# Patient Record
Sex: FEMALE | Race: WHITE
[De-identification: names, ages, dates, MRNs, and addresses within clinical notes are randomized per-mention and may not be internally consistent; named-entity substitution may affect disease eponyms.]

---

## 2019-01-16 ENCOUNTER — HOSPITAL ENCOUNTER (EMERGENCY)
Dept: HOSPITAL 97 - ER | Age: 74
Discharge: HOME | End: 2019-01-16
Payer: COMMERCIAL

## 2019-01-16 VITALS — DIASTOLIC BLOOD PRESSURE: 62 MMHG | SYSTOLIC BLOOD PRESSURE: 157 MMHG

## 2019-01-16 VITALS — OXYGEN SATURATION: 100 % | TEMPERATURE: 98.2 F

## 2019-01-16 DIAGNOSIS — I10: ICD-10-CM

## 2019-01-16 DIAGNOSIS — Z79.899: ICD-10-CM

## 2019-01-16 DIAGNOSIS — K21.9: ICD-10-CM

## 2019-01-16 DIAGNOSIS — R07.9: Primary | ICD-10-CM

## 2019-01-16 DIAGNOSIS — E11.9: ICD-10-CM

## 2019-01-16 LAB
BUN BLD-MCNC: 14 MG/DL (ref 7–18)
GLUCOSE SERPLBLD-MCNC: 102 MG/DL (ref 74–106)
HCT VFR BLD CALC: 28.7 % (ref 36–45)
LYMPHOCYTES # SPEC AUTO: 2.6 K/UL (ref 0.7–4.9)
PMV BLD: 7.5 FL (ref 7.6–11.3)
POTASSIUM SERPL-SCNC: 3.8 MMOL/L (ref 3.5–5.1)
RBC # BLD: 3.23 M/UL (ref 3.86–4.86)

## 2019-01-16 PROCEDURE — 85025 COMPLETE CBC W/AUTO DIFF WBC: CPT

## 2019-01-16 PROCEDURE — 36415 COLL VENOUS BLD VENIPUNCTURE: CPT

## 2019-01-16 PROCEDURE — 71275 CT ANGIOGRAPHY CHEST: CPT

## 2019-01-16 PROCEDURE — 99285 EMERGENCY DEPT VISIT HI MDM: CPT

## 2019-01-16 PROCEDURE — 80048 BASIC METABOLIC PNL TOTAL CA: CPT

## 2019-01-16 NOTE — ER
Nurse's Notes                                                                                     

 [facility]                                                                

Name: Rossi Caro                                                                              

Age: 73 yrs                                                                                       

Sex: Female                                                                                       

: 1945                                                                                   

MRN: [id number]                                                                                   

Arrival Date: 2019                                                                          

Time: 16:09                                                                                       

Account#: [id number]                                                                            

Bed 18                                                                                            

Private MD:                                                                                       

Diagnosis: Chest pain, unspecified                                                                

                                                                                                  

Presentation:                                                                                     

                                                                                             

16:20 Presenting complaint: EMS states: She had some left sided chest tightness and shortness aj1 

      of breath this morning at 0700 that lasted approximately 2 minutes[address] Patient's Dr            

      ordered labs and her D-Dimer came out elevated. Patient has a cast on left leg from         

      recent femur fracture sustained on 2019. Transition of care: patient was       

      received from another setting of care (long-term care [facility]), [facility]. Onset of      

      symptoms was 2019 at 07:00. Risk Assessment: Do you want to hurt yourself       

      or someone else? Patient reports no desire to harm self or others. Initial Sepsis           

      Screen: Does the patient meet any 2 criteria? Yes Does the patient have a suspected         

      source of infection? No. Patient's initial sepsis screen is negative. Care prior to         

      arrival: None.                                                                              

16:20 Method Of Arrival: EMS: [facility]                                                aj1 

16:20 Acuity: DIDI 3                                                                           aj1 

                                                                                                  

Triage Assessment:                                                                                

16:32 General: Appears in no apparent distress. comfortable, Behavior is calm, cooperative,   aj1 

      appropriate for age. Pain: Denies pain. Neuro: Level of Consciousness is awake, alert,      

      obeys commands.                                                                             

                                                                                                  

Historical:                                                                                       

- Allergies:                                                                                      

16:32 Phenergan;                                                                              aj1 

- Home Meds:                                                                                      

16:32 Zofran (as hydrochloride) 4 mg Oral tab 1 tabs every 8 hours as needed [Active];        aj1 

      loperamide 2 mg Oral tab 1 tabs every 4 hours as needed [Active]; Xiidra One drop in        

      both eyes every 12 hours [Active]; calcium carbonate 1000 mg Oral tab twice a day           

      [Active]; cholecalciferol (vitamin D3) oral oral daily [Active]; omeprazole 20 mg Oral      

      cpDR 1 cap once daily [Active]; Lovenox 40 mg/0.4 mL Sub-Q syrg once daily [Active];        

      docusate sodium 100 mg Oral cap 1 cap once daily [Active]; Protonix 40 mg Oral TbEC 1       

      tab once daily [Active]; hydrochlorothiazide 12.5 mg Oral tab 1 tab once daily              

      [Active]; Lumigan 0.01 % ophthalmic drop one drop in both eyes at bedtime [Active];         

      metformin 500 mg Oral Tb24 1 tab 2 times per day [Active]; tramadol 50 mg Oral tab 1        

      tab every 6 hours as needed [Active];                                                       

- PMHx:                                                                                           

16:32 Diabetes - NIDDM; Hypertension; femur fracture; GERD; Glaucoma;                         aj1 

                                                                                                  

- Immunization history:: Flu vaccine is up to date.                                               

- Social history:: Smoking status: Patient/guardian denies using tobacco.                         

- Ebola Screening: : Patient denies travel to an Ebola-affected area in the 21 days               

  before illness onset.                                                                           

- Family history:: not pertinent.                                                                 

- Hospitalizations: : No recent hospitalization is reported.                                      

                                                                                                  

                                                                                                  

Screenin:01 Abuse screen: Denies threats or abuse. Denies injuries from another. Nutritional        aj1 

      screening: No deficits noted. Tuberculosis screening: No symptoms or risk factors           

      identified.                                                                                 

21:58 Fall Risk Fall in past 12 months (25 points). No secondary diagnosis (0 pts). No IV (0  aj1 

      pts). Ambulatory Aid- Crutches/Cane/Walker (15 pts). Gait- Impaired (20 pts.). Mental       

      Status- Oriented to own ability (0 pts). Total Martinez Fall Scale indicates High Risk         

      Score (45 or more points). As available patient and family educated on Fall Prevention      

      Program and Strategies.                                                                     

                                                                                                  

Assessment:                                                                                       

17:01 General: Appears in no apparent distress. comfortable, Behavior is calm, cooperative,   aj1 

      appropriate for age. Pain: Denies pain. Neuro: Level of Consciousness is awake, alert,      

      obeys commands. Cardiovascular: Reports chest pain, that has now resolved Heart tones       

      S1 S2 present Patient's skin is warm and dry. Rhythm is sinus rhythm. Respiratory:          

      Reports shortness of breath that has now resolved Airway is patent Respiratory effort       

      is even, unlabored, Respiratory pattern is regular, symmetrical. GI: No signs and/or        

      symptoms were reported involving the gastrointestinal system. : No signs and/or           

      symptoms were reported regarding the genitourinary system. EENT: No signs and/or            

      symptoms were reported regarding the EENT system. Derm: No signs and/or symptoms            

      reported regarding the dermatologic system. Skin is pink, warm \T\ dry. normal.             

      Musculoskeletal: Capillary refill < 3 seconds, in left toes. Range of motion: limited       

      in left knee Patient has a recent femur fracture.                                           

18:03 Reassessment: Patient appears in no apparent distress at this time. No changes from     [facility] 

      previously documented assessment. Patient and/or family updated on plan of care and         

      expected duration. Pain level reassessed. Patient is alert, oriented x 3, equal             

      unlabored respirations, skin warm/dry/pink.                                                 

18:52 Reassessment: Report given to [facility]. They will arrange transport to    [facility] 

      send patient back.                                                                          

19:00 Reassessment: Patient appears in no apparent distress at this time. No changes from     [facility] 

      previously documented assessment. Patient and/or family updated on plan of care and         

      expected duration. Pain level reassessed. Patient is alert, oriented x 3, equal             

      unlabored respirations, skin warm/dry/pink.                                                 

20:00 Reassessment: Spoke with staff at [facility], states that transfer service is on the   [facility] 

      way.                                                                                        

20:00 Reassessment: Patient appears in no apparent distress at this time. No changes from     [facility] 

      previously documented assessment. Patient and/or family updated on plan of care and         

      expected duration. Pain level reassessed. Patient is alert, oriented x 3, equal             

      unlabored respirations, skin warm/dry/pink.                                                 

21:00 Reassessment: Patient appears in no apparent distress at this time. No changes from     [facility] 

      previously documented assessment. Patient and/or family updated on plan of care and         

      expected duration. Pain level reassessed. Patient is alert, oriented x 3, equal             

      unlabored respirations, skin warm/dry/pink.                                                 

21:30 Reassessment: Spoke with staff at [facility], states that it is raining really hard    [facility] 

      where the transport van is, so they are going slower than normal, but they are on their     

      way.                                                                                        

                                                                                                  

Vital Signs:                                                                                      

16:32  / 58; Pulse 85; Resp 14; Temp 98.2; Pulse Ox 100% on R/A; Pain 0/10;             aj1 

18:53  / 62; Pulse 88; Resp 16; Pulse Ox 100% on R/A;                                   aj1 

                                                                                                  

ED Course:                                                                                        

16:09 Patient arrived in ED.                                                                  rn  

16:09 Rajendra Sepulveda MD is Attending Physician.                                                rn  

16:12 Radiology exam delayed due to lab results not completed at this time. (BUN/Creatinine)  vm2 

      IV insertion attempt and/or patient not having appropriate IV at this time.                 

16:15 Marcos, Irma, RN is Primary Nurse.                                                   [facility] 

16:20 Arm band placed on Patient placed in an exam room.                                      aj1 

16:20 Patient has correct armband on for positive identification. Placed in gown. Bed in low  aj 

      position. Call light in reach. Cardiac monitor on. Pulse ox on. NIBP on.                    

16:20 No provider procedures requiring assistance completed.                                  1 

16:23 Triage completed.                                                                       [facility] 

16:44 Radiology exam delayed due to lab results not completed at this time. (BUN/Creatinine). 2 

17:00 Radiology exam delayed due to lab results not completed at this time. (BUN/Creatinine). [facility] 

17:03 Initial lab(s) drawn, by me, sent to lab. Inserted saline lock: 22 gauge in right       [facility] 

      antecubital area, using aseptic technique. Blood collected.                                 

17:17 Radiology exam delayed due to lab results not completed at this time. (BUN/Creatinine). [facility] 

17:30 Patient moved to CT via stretcher.                                                      [facility] 

17:48 CT completed. Patient tolerated procedure well. Patient moved back from CT.             nj  

17:49 CT Chest For PE Angio In Process Unspecified.                                           EDMS

18:52 IV discontinued, intact, bleeding controlled, No redness/swelling at site. Pressure     [facility] 

      dressing applied.                                                                           

                                                                                                  

Administered Medications:                                                                         

No medications were administered                                                                  

                                                                                                  

                                                                                                  

Outcome:                                                                                          

18:04 Discharge ordered by MD.                                                                rn  

21:59 Discharged to nursing home.                                                             aj1 

21:59 Condition: stable                                                                           

21:59 Discharge instructions given to patient, Instructed on discharge instructions, follow       

      up and referral plans. Demonstrated understanding of instructions, follow-up care.          

21:59 Patient left the ED.                                                                    aj 

                                                                                                  

Signatures:                                                                                       

Dispatcher [employer]                           EDMS                                                 

Irma Rodríguez, ALEX                     RN   aj                                                  

Rajendra Sepulveda MD MD rn Jordan, Nathan nj McGuire, Victoria                            [facility]                                                  

                                                                                                  

Corrections: (The following items were deleted from the chart)                                    

17:03 16:32 Arm band placed on Patient placed in an exam room, [facility]                            aj 

                                                                                                  

**************************************************************************************************

## 2019-01-16 NOTE — EDPHYS
Physician Documentation                                                                           

 [facility]                                                                

Name: Rossi Caro                                                                              

Age: 73 yrs                                                                                       

Sex: Female                                                                                       

: 1945                                                                                   

MRN: [id number]                                                                                   

Arrival Date: 2019                                                                          

Time: 16:09                                                                                       

Account#: [id number]                                                                            

Bed 18                                                                                            

Private MD:                                                                                       

ED Physician Rajendra Sepulveda                                                                         

HPI:                                                                                              

                                                                                             

16:31 This 73 yrs old  Female presents to ER via EMS with complaints of elevated     rn  

      d-dimer.                                                                                    

16:31 Sent form nursing home for elevated d-dimer, reports this morning had left sided chest  rn  

      pain, only lasted a few seconds, assoc with nausea, had blood drawn and only                

      abnormality was d-dimer, sent for further evaluation. .                                     

                                                                                                  

Historical:                                                                                       

- Allergies:                                                                                      

16:32 Phenergan;                                                                              aj1 

- Home Meds:                                                                                      

16:32 Zofran (as hydrochloride) 4 mg Oral tab 1 tabs every 8 hours as needed [Active];        aj1 

      loperamide 2 mg Oral tab 1 tabs every 4 hours as needed [Active]; Xiidra One drop in        

      both eyes every 12 hours [Active]; calcium carbonate 1000 mg Oral tab twice a day           

      [Active]; cholecalciferol (vitamin D3) oral oral daily [Active]; omeprazole 20 mg Oral      

      cpDR 1 cap once daily [Active]; Lovenox 40 mg/0.4 mL Sub-Q syrg once daily [Active];        

      docusate sodium 100 mg Oral cap 1 cap once daily [Active]; Protonix 40 mg Oral TbEC 1       

      tab once daily [Active]; hydrochlorothiazide 12.5 mg Oral tab 1 tab once daily              

      [Active]; Lumigan 0.01 % ophthalmic drop one drop in both eyes at bedtime [Active];         

      metformin 500 mg Oral Tb24 1 tab 2 times per day [Active]; tramadol 50 mg Oral tab 1        

      tab every 6 hours as needed [Active];                                                       

- PMHx:                                                                                           

16:32 Diabetes - NIDDM; Hypertension; femur fracture; GERD; Glaucoma;                         aj1 

                                                                                                  

- Immunization history:: Flu vaccine is up to date.                                               

- Social history:: Smoking status: Patient/guardian denies using tobacco.                         

- Ebola Screening: : Patient denies travel to an Ebola-affected area in the 21 days               

  before illness onset.                                                                           

- Family history:: not pertinent.                                                                 

- Hospitalizations: : No recent hospitalization is reported.                                      

                                                                                                  

                                                                                                  

ROS:                                                                                              

16:36 Constitutional: Negative for fever, chills, and weight loss, Eyes: Negative for injury, rn  

      pain, redness, and discharge, Cardiovascular: Negative for chest pain, palpitations,        

      and edema, Respiratory: Negative for shortness of breath, cough, wheezing, and              

      pleuritic chest pain, Abdomen/GI: Negative for abdominal pain, nausea, vomiting,            

      diarrhea, and constipation, MS/Extremity: Negative for injury and deformity, Skin:          

      Negative for injury, rash, and discoloration, Neuro: Negative for headache, weakness,       

      numbness, tingling, and seizure.                                                            

                                                                                                  

Exam:                                                                                             

16:36 Constitutional:  This is a well developed, well nourished patient who is awake, alert,  rn  

      and in no acute distress. Head/Face:  Normocephalic, atraumatic. Cardiovascular:            

      Regular rate and rhythm, no JVD.  No pulse deficits. Respiratory:  Lungs have equal         

      breath sounds bilaterally, clear to auscultation, No increased work of breathing, no        

      retractions or nasal flaring. Abdomen/GI:  soft, non-tender Skin:  Warm, dry with           

      normal turgor.  Normal color with no rashes, no lesions, and no evidence of cellulitis.     

      MS/ Extremity:  Pulses equal, no cyanosis.  Neurovascular intact.  + LLE in brace           

      Neuro:  Awake and alert, GCS 15, oriented to person, place, time, and situation.            

      Cranial nerves II-XII grossly intact.  Motor strength 5/5 in all extremities.  Sensory      

      grossly intact.                                                                             

                                                                                                  

Vital Signs:                                                                                      

16:32  / 58; Pulse 85; Resp 14; Temp 98.2; Pulse Ox 100% on R/A; Pain 0/10;             aj1 

18:53  / 62; Pulse 88; Resp 16; Pulse Ox 100% on R/A;                                   aj1 

                                                                                                  

MDM:                                                                                              

16:09 Patient medically screened.                                                             rn  

16:38 ED course: Pt reports recent left femur fracture and surgery.                           rn  

18:02 Differential Diagnosis PE, pneumonia, pleurisy. Data reviewed: vital signs, nurses      rn  

      notes, lab test result(s), radiologic studies, CT scan, and as a result, I will             

      discharge patient. Counseling: I had a detailed discussion with the patient and/or          

      guardian regarding: the historical points, exam findings, and any diagnostic results        

      supporting the discharge/admit diagnosis, lab results, radiology results, the need for      

      outpatient follow up, to return to the emergency department if symptoms worsen or           

      persist or if there are any questions or concerns that arise at home. Special               

      discussion: I discussed with the patient/guardian in detail that at this point there is     

      no indication for admission to the hospital. It is understood, however, that if the         

      symptoms persist or worsen the patient needs to return immediately for re-evaluation.       

18:02 ED course: Pt asymptomatic, neg CT chest for PE, will dc home..                         rn  

                                                                                                  

                                                                                             

16:10 Order name: CBC with Diff; Complete Time: 17:                                         rn  

                                                                                             

16:10 Order name: Basic Metabolic Panel; Complete Time: 17:                                 rn  

                                                                                             

16:10 Order name: IV Start; Complete Time: 17:04                                              rn  

                                                                                             

16:10 Order name: CT Chest For PE Angio; Complete Time: 18:                                 rn  

                                                                                                  

Administered Medications:                                                                         

No medications were administered                                                                  

                                                                                                  

                                                                                                  

Disposition:                                                                                      

19 18:04 Discharged to Home. Impression: Chest pain, unspecified.                           

- Condition is Stable.                                                                            

- Discharge Instructions: Nonspecific Chest Pain.                                                 

                                                                                                  

- Medication Reconciliation Form, Thank You Letter, Antibiotic Education, Prescription            

  Opioid Use form.                                                                                

- Follow up: Private Physician; When: As needed; Reason: Recheck today's complaints,              

  Re-evaluation by your physician.                                                                

- Problem is new.                                                                                 

- Symptoms have improved.                                                                         

                                                                                                  

                                                                                                  

                                                                                                  

Signatures:                                                                                       

Dispatcher [employer]                           EDMS                                                 

Irma Rodríguez RN                     RN   aj1                                                  

Rajendra Sepulveda MD MD   rn                                                   

                                                                                                  

Corrections: (The following items were deleted from the chart)                                    

21:59 18:04 2019 18:04 Discharged to Home. Impression: Chest pain, unspecified.         aj1 

      Condition is Stable. Forms are Medication Reconciliation Form, Thank You Letter,            

      Antibiotic Education, Prescription Opioid Use. Follow up: Private Physician; When: As       

      needed; Reason: Recheck today's complaints, Re-evaluation by your physician. Problem is     

      new. Symptoms have improved. rn                                                             

                                                                                                  

**************************************************************************************************